# Patient Record
Sex: MALE | Race: WHITE
[De-identification: names, ages, dates, MRNs, and addresses within clinical notes are randomized per-mention and may not be internally consistent; named-entity substitution may affect disease eponyms.]

---

## 2018-11-06 ENCOUNTER — HOSPITAL ENCOUNTER (OUTPATIENT)
Dept: HOSPITAL 47 - ORWHC2ENDO | Age: 58
Discharge: HOME | End: 2018-11-06
Payer: MEDICARE

## 2018-11-06 VITALS — SYSTOLIC BLOOD PRESSURE: 146 MMHG | DIASTOLIC BLOOD PRESSURE: 87 MMHG

## 2018-11-06 VITALS — TEMPERATURE: 98.2 F | RESPIRATION RATE: 16 BRPM

## 2018-11-06 VITALS — BODY MASS INDEX: 35.3 KG/M2

## 2018-11-06 VITALS — HEART RATE: 71 BPM

## 2018-11-06 DIAGNOSIS — Z79.899: ICD-10-CM

## 2018-11-06 DIAGNOSIS — Z79.84: ICD-10-CM

## 2018-11-06 DIAGNOSIS — E11.9: ICD-10-CM

## 2018-11-06 DIAGNOSIS — D12.5: ICD-10-CM

## 2018-11-06 DIAGNOSIS — I10: ICD-10-CM

## 2018-11-06 DIAGNOSIS — K62.1: ICD-10-CM

## 2018-11-06 DIAGNOSIS — Z12.11: Primary | ICD-10-CM

## 2018-11-06 DIAGNOSIS — E78.5: ICD-10-CM

## 2018-11-06 DIAGNOSIS — Z79.891: ICD-10-CM

## 2018-11-06 DIAGNOSIS — F17.210: ICD-10-CM

## 2018-11-06 LAB
GLUCOSE BLD-MCNC: 110 MG/DL (ref 75–99)
GLUCOSE BLD-MCNC: 125 MG/DL (ref 75–99)

## 2018-11-06 PROCEDURE — 88305 TISSUE EXAM BY PATHOLOGIST: CPT

## 2018-11-06 PROCEDURE — 45380 COLONOSCOPY AND BIOPSY: CPT

## 2018-11-06 NOTE — P.PCN
Date of Procedure: 11/06/18


Procedure(s) Performed: 


Procedure: Colonoscopy and biopsy.





Preoperative diagnosis: Screening for neoplasia.





Postoperative diagnosis: Diminutive polyps in the sigmoid and rectum biopsied, 

otherwise, exam to the cecum within normal limits.





Preparation: HalfLytely prep.





Sedation: Was provided by anesthesia.





Brief clinical history: The patient is a 58-year-old male who is scheduled for 

this evaluation for screening for neoplasia age being his risk factor.  There 

is no family history of colon cancer.  He has no abdominal complaints, bleeding 

or anemia.  This would be his first colonoscopy.





Procedure: With the patient on his left lateral decubitus position and after 

informed consent and adequate sedation, the perianal area was inspected and it 

did not show any fissures or fistulas.  There were no masses felt on digital 

rectal examination.  The Olympus CFQ 160L video colonoscope was then inserted 

in the rectum in the usual fashion and advanced to the cecum.  There was a 

diminutive polyp in the rectum and another one in the distal sigmoid which were 

biopsied but there were no large polyps or cancer.  The mucosa appeared 

healthy.  No polyps or tumors were seen or any obvious diverticular disease or 

other pathology.  I retroflexed the endoscope in the rectum before the 

endoscope was withdrawn.





The patient tolerated the procedure well.





Plan: The patient was reassured.  He will follow up with you as planned and I 

recommended repeat exam in 5-10 years depending on the pathology results.

## 2022-10-04 ENCOUNTER — HOSPITAL ENCOUNTER (OUTPATIENT)
Dept: HOSPITAL 47 - RADCTMAIN | Age: 62
Discharge: HOME | End: 2022-10-04
Attending: FAMILY MEDICINE
Payer: MEDICARE

## 2022-10-04 DIAGNOSIS — Z12.2: Primary | ICD-10-CM

## 2022-10-04 DIAGNOSIS — Z87.891: ICD-10-CM

## 2022-10-04 PROCEDURE — 71271 CT THORAX LUNG CANCER SCR C-: CPT

## 2022-10-04 NOTE — CTL
EXAMINATION TYPE:  CT Low Dose Lung

 

DATE OF EXAM ORDERED: 10/4/2022

 

HISTORY: Z87.891 personal hx . Lung cancer screening

 

CT DLP: 96 mGycm

CT CTDI: 2.6 mGy

Automated exposure control for dose reduction was used.

 

SCREENING VISIT: First screening visit.

 

COMPARISON: None

 

TECHNIQUE: Low dose computed tomography scan was performed through the chest at 1 mm thick sections a
nd reconstructed images in multiple planes at 1 mm and 5 mm thick sections.

 

CT DIAGNOSTIC QUALITY: Satisfactory

 

FINDINGS:

LUNG NODULES/PARENCHYMA: Right upper lobe peripheral calcified 4 mm granuloma. No concerning pulmonar
y nodules. Scarring and/or atelectasis within the lingula and right middle lobe.

 

LUNGS:

COPD: Severity: None

Fibrosis: Severity: None

Lymph nodes: None

Other findings: None

 

RIGHT PLEURAL SPACE:

Effusion: None

Calcification: None

Thickening: None

Pneumothorax: None

 

LEFT PLEURAL SPACE:

Effusion: None

Calcification: None

Thickening: None

Pneumothorax: None

 

HEART:  

Heart Size: Normal

Coronary Calcification: Minimal

Pericardial Effusion: None

 

OTHER FINDINGS:

Upper abdomen: Cholelithiasis.

Bony thorax: None

Supraclavicular region: None

Other: None

 

IMPRESSION: No concerning pulmonary nodules.

 

CT LUNG RAD AND CT CHEST RECOMMENDATION: Lung-Rad 1 Negative: Continue annual screening with LDCT in 
12 months.

 

S Modifier (other clinically significant findings): None

## 2023-06-13 ENCOUNTER — HOSPITAL ENCOUNTER (OUTPATIENT)
Dept: HOSPITAL 47 - RADECHMAIN | Age: 63
Discharge: HOME | End: 2023-06-13
Attending: STUDENT IN AN ORGANIZED HEALTH CARE EDUCATION/TRAINING PROGRAM
Payer: MEDICARE

## 2023-06-13 DIAGNOSIS — R06.09: Primary | ICD-10-CM

## 2023-06-13 PROCEDURE — 93306 TTE W/DOPPLER COMPLETE: CPT

## 2023-06-13 NOTE — CA
Transthoracic Echo Report 

 Name: Mino Alcaraz 

 MRN:    [id number] 

 Age:    62     Gender:     M 

 

 :    1960 

 Exam Date:     2023 08:42 

 Exam Location: [facility] 

 Ht (in):     67     Wt (lb):     215 

 Ordering Physician:        Kdtajuan, Physician 

 Attending/Referring Phys:         Jason Sanchez MD 

 Technician         Park Doll, [place] 

 Procedure CPT: 

 Indications:       R06.09 

 

 Cardiac Hx: 

 Technical Quality:      Good 

 Contrast 1:                                Total Dose (mL): 

 Contrast 2:                                Total Dose (mL): 

 

 MEASUREMENTS  (Male / Female) Normal Values 

 2D ECHO 

 LV Diastolic Diameter PLAX        4.1 cm                4.2 - 5.9 / 3.9 - 5.3 cm 

 LV Systolic Diameter PLAX         2.6 cm                 

 IVS Diastolic Thickness           0.9 cm                0.6 - 1.0 / 0.6 - 0.9 cm 

 LVPW Diastolic Thickness          1.0 cm                0.6 - 1.0 / 0.6 - 0.9 cm 

 LV Relative Wall Thickness        0.5                    

 RV Internal Dim ED PLAX           3.5 cm                 

 LA Systolic Diameter LX           3.7 cm                3.0 - 4.0 / 2.7 - 3.8 cm 

 LV Diastolic Volume MOD 4C        100.2 cm???              

 LV Systolic Volume MOD 4C         41.9 cm???               

 LV Ejection Fraction MOD 4C       58.1 %                 

 LV Cardiac Index MOD 4C           1734.0 cm???/min???m???      

 LV Diastolic Length 4C            8.9 cm                 

 LV Systolic Length 4C             7.1 cm                 

 LV Diastolic Volume MOD 2C        98.4 cm???               

 LV Systolic Volume MOD 2C         47.7 cm???               

 LV Ejection Fraction MOD 2C       51.5 %                 

 LV Cardiac Index MOD 2C           1508.2 cm???/min???m???      

 LV Diastolic Length 2C            9.1 cm                 

 LV Systolic Length 2C             7.1 cm                 

 LA Volume                         60.1 cm???              18 - 58 / 22 - 52 cm??? 

 

 M-MODE 

 Aortic Root Diameter MM           3.4 cm                 

 MV E Point Septal Separation      0.3 cm                 

 AV Cusp Separation MM             2.2 cm                 

 

 DOPPLER 

 AV Peak Velocity                  142.2 cm/s             

 AV Peak Gradient                  8.1 mmHg               

 MV Area PHT                       3.8 cm???                

 Mitral E Point Velocity           101.3 cm/s             

 Mitral A Point Velocity           86.5 cm/s              

 Mitral E to A Ratio               1.2                    

 MV Deceleration Time              200.1 ms               

 MV E' Velocity                    7.6 cm/s               

 Mitral E to MV E' Ratio           13.2                   

 

 

 FINDINGS 

 Left Ventricle 

 Left ventricular ejection fraction is estimated at 55-60 %. Left ventricular  

 cavity size normal.normal left ventricular wall motion. Normal left ventricular  

 diastolic filling pattern. 

 

 Right Ventricle 

 Mild right ventricular dilatation. Unable to estimate the right ventricular  

 systolic pressure. 

 

 Right Atrium 

 Normal right atrial size. 

 

 Left Atrium 

 Mildly increased left atrial volume. Mildly increased left atrial area. 

 

 Mitral Valve 

 Structurally normal mitral valve. No mitral stenosis,  or prolapse.  Trace  

 regurgitation 

 

 Aortic Valve 

 Trileaflet aortic valve. No aortic valve stenosis or regurgitation. 

 

 Tricuspid Valve 

 Structurally normal tricuspid valve. No tricuspid stenosis, regurgitation or  

 prolapse. 

 

 Pulmonic Valve 

 Structurally normal pulmonic valve. No pulmonic regurgitation. 

 

 Pericardium 

 Normal pericardium. No pericardial effusion. 

 

 Aorta 

 Normal size aortic root and proximal ascending aorta. 

 

 CONCLUSIONS 

 1.  Normal left ventricle size and systolic function 

 2.  No significant valvular abnormality 

 3.  No pericardial effusion 

 Previewed by:  

 Dr. Niko Mcgill MD 

 (Electronically Signed) 

 Final Date:      2023 12:19